# Patient Record
Sex: FEMALE | Race: WHITE
[De-identification: names, ages, dates, MRNs, and addresses within clinical notes are randomized per-mention and may not be internally consistent; named-entity substitution may affect disease eponyms.]

---

## 2020-12-11 ENCOUNTER — HOSPITAL ENCOUNTER (OUTPATIENT)
Dept: HOSPITAL 35 - LAB | Age: 75
End: 2020-12-11
Attending: INTERNAL MEDICINE
Payer: COMMERCIAL

## 2020-12-11 DIAGNOSIS — Z01.812: Primary | ICD-10-CM

## 2020-12-11 DIAGNOSIS — Z20.828: ICD-10-CM

## 2020-12-15 ENCOUNTER — HOSPITAL ENCOUNTER (OUTPATIENT)
Dept: HOSPITAL 35 - GI | Age: 75
Discharge: HOME | End: 2020-12-15
Attending: INTERNAL MEDICINE
Payer: COMMERCIAL

## 2020-12-15 VITALS — BODY MASS INDEX: 17.36 KG/M2 | HEIGHT: 65.98 IN | WEIGHT: 108 LBS

## 2020-12-15 DIAGNOSIS — Z98.890: ICD-10-CM

## 2020-12-15 DIAGNOSIS — Z90.49: ICD-10-CM

## 2020-12-15 DIAGNOSIS — Z88.0: ICD-10-CM

## 2020-12-15 DIAGNOSIS — Z96.651: ICD-10-CM

## 2020-12-15 DIAGNOSIS — Z88.8: ICD-10-CM

## 2020-12-15 DIAGNOSIS — K31.9: ICD-10-CM

## 2020-12-15 DIAGNOSIS — K52.9: ICD-10-CM

## 2020-12-15 DIAGNOSIS — R42: ICD-10-CM

## 2020-12-15 DIAGNOSIS — R19.4: Primary | ICD-10-CM

## 2020-12-15 DIAGNOSIS — K44.9: ICD-10-CM

## 2020-12-15 DIAGNOSIS — K64.8: ICD-10-CM

## 2020-12-15 DIAGNOSIS — F17.210: ICD-10-CM

## 2020-12-15 DIAGNOSIS — R63.4: ICD-10-CM

## 2020-12-15 DIAGNOSIS — Z79.899: ICD-10-CM

## 2020-12-15 PROCEDURE — 62900: CPT

## 2020-12-15 PROCEDURE — 62110: CPT

## 2020-12-17 NOTE — PATH
[facility]
[address]
[place]   [address]                     PATHOLOGY RPT PROCEDURE       
_______________________________________________________________________________
 
Name:       KAYODE GRAMAJOENID BUNCH               Room #:                     REG KIN 
ASA.#:      [id number]     Account #:      [id number]  
Admission:  12/15/20    Date of Birth:  [birth year]  
Discharge:                                              Report #:    [id number]
                                                        Path Case #: [id number]
_______________________________________________________________________________
 
[facility] Accession Number: [id number]
.                                                                01
Material submitted:                                        .
PART A: duodenum - DUODENAL BIOPSY R/O CELIAC
PART B: stomach - ANTRUM BIOPSY R/O H. PYLORI
PART C: colon - RANDOM COLON BIOPSY R/O MICRO COLITIS
.                                                                01
Clinical history:                                          .
WEIGHT LOS, COLITIS; INTERNAL HEMORROIDS; DIARRHEA
.                                                                02
**********************************************************************
Diagnosis:
A.  Small bowel mucosa, duodenum to rule out celiac, endoscopic biopsy:
- No significant diagnostic abnormalities present.
- Negative for villous blunting or increase in intraepithelial
lymphocytes.
.
B.  Gastric mucosa, antrum to rule out H. pylori, endoscopic biopsy:
- Mild reactive gastropathy.
- Negative for intestinal metaplasia or atrophy.
- Negative for Helicobacter pylori (properly controlled
immunohistochemical stain performed).
.
C.  Large intestine, random colon to rule out microscopic colitis,
endoscopic biopsy:
- Mild to moderate active colitis involving all fragments sampled.
- Negative for dysplasia or malignancy.
.
(IUV:; 12/17/2020)
MBR  12/17/2020  1236 Local
**********************************************************************
.                                                                02
Comment:
C.  Sections of the colonic mucosa designated "random" show focal
cryptitis, and a moderately cellular lamina propria composed predominantly
of lymphocytes and plasma cells and occasional eosinophils.  Surface
ulceration is not identified.  There are no crypt abscesses, granulomas or
viral inclusions.  The process affects all the fragments with a similar
intensity.  Given the description, the differential diagnosis includes
active colitis due to an infectious etiology, early inflammatory bowel
disease, medication or drug induced colitis, as well as diverticulitis.
Please correlate with clinical as well as endoscopic findings.
(IUV:; 12/17/2020)
.                                                                02
Electronically signed:                                     .
Cristy Oneil MD, Pathologist
NPI- [id number]
 
 
[place] [address]                     PATHOLOGY RPT PROCEDURE       
_______________________________________________________________________________
 
Name:       IRIS GRAMAJO               Room #:                     REG TAYLOR GUILLEN#:      [id number]     Account #:      [id number]  
Admission:  12/15/20    Date of Birth:  [birth year]  
Discharge:                                              Report #:    [id number]
                                                        Path Case #: [id number]
_______________________________________________________________________________
.                                                                01
Gross description:                                         .
A.  The specimen is received in formalin, labeled "Kayode Gramajoe",
"duodenal biopsy on the requisition, and random duodenal biopsy on the
container".  Received are 2 segments of pale tan soft tissue measuring 0.1
and 0.2 cm.  The specimen is entirely submitted in cassette A1.
.
B.  The specimen is received in formalin, labeled "Iris Gramajo",
"antrum biopsy".  Received are 2 segments of pale tan soft tissue
measuring 0.2 and 0.3 cm.  The specimen is entirely submitted in cassette
B1.
.
C.  The specimen is received in formalin, labeled "Avila Revealexeie",
"random colon biopsy".  Received are multiple segments of pale tan soft
tissue, ranging in size from 0.1 cm to 0.3 cm.  The specimen is entirely
submitted in cassette C1.([facility]; 12/16/2020)
CHRISTINE/RUTHANN  12/16/2020  1558 Local
.                                                                02
Pathologist provided ICD-10:
K31.9, K52.9
.                                                                02
CPT                                                        .
272534, 950093, 112300, T77550
Specimen Comment: A courtesy copy of this report has been sent to [phone],
[phone]
Specimen Comment: [id number]
Specimen Comment: Report sent to  / DR SIMMONS
***Performed at:  01
   [facility]
   [address] [place] [address] [address], [place], [place]  [id number]
   MD Maurice Mota MD Phone:  [phone]
***Performed at:  02
   [facility]  [id number]
   MD Cristy Oneil MD Phone:  [phone]